# Patient Record
Sex: MALE | Race: WHITE | NOT HISPANIC OR LATINO
[De-identification: names, ages, dates, MRNs, and addresses within clinical notes are randomized per-mention and may not be internally consistent; named-entity substitution may affect disease eponyms.]

---

## 2023-06-29 ENCOUNTER — APPOINTMENT (OUTPATIENT)
Dept: ORTHOPEDIC SURGERY | Facility: CLINIC | Age: 38
End: 2023-06-29
Payer: OTHER MISCELLANEOUS

## 2023-06-29 VITALS — HEIGHT: 74 IN | WEIGHT: 200 LBS | BODY MASS INDEX: 25.67 KG/M2

## 2023-06-29 DIAGNOSIS — S16.1XXA STRAIN OF MUSCLE, FASCIA AND TENDON AT NECK LEVEL, INITIAL ENCOUNTER: ICD-10-CM

## 2023-06-29 PROBLEM — Z00.00 ENCOUNTER FOR PREVENTIVE HEALTH EXAMINATION: Status: ACTIVE | Noted: 2023-06-29

## 2023-06-29 PROCEDURE — 73030 X-RAY EXAM OF SHOULDER: CPT | Mod: RT

## 2023-06-29 PROCEDURE — 99203 OFFICE O/P NEW LOW 30 MIN: CPT | Mod: ACP

## 2023-06-29 RX ORDER — MELOXICAM 15 MG/1
15 TABLET ORAL
Qty: 30 | Refills: 0 | Status: ACTIVE | COMMUNITY
Start: 2023-06-29 | End: 1900-01-01

## 2023-06-29 RX ORDER — TIZANIDINE 4 MG/1
4 TABLET ORAL
Qty: 30 | Refills: 0 | Status: ACTIVE | COMMUNITY
Start: 2023-06-29 | End: 1900-01-01

## 2023-06-29 NOTE — DISCUSSION/SUMMARY
[de-identified] : Impression: Right shoulder injury possible rotator cuff injury and cervical strain.\par \par Plan: Patient was advised for physical therapy for his right shoulder and neck.\par Patient was advised for muscle relaxant and anti-inflammatories.\par Patient was advised for MRI of the right shoulder to evaluate any rotator cuff injury.\par At this time he is marked temporarily disabled unable to return to work until further notice.\par \par Follow-up: 4 weeks for repeat evaluation.\par \par

## 2023-06-29 NOTE — HISTORY OF PRESENT ILLNESS
[de-identified] : 37-year-old male here for an evaluation of using to the right shoulder, patient states that this injury happened on about June 22, 2023, patient states that he is a , he was at work when he was wrestling with a perpetrator and both of them fell on the floor, he landed on his right shoulder, patient states that also when he was applying the handcuffs the perpetrator was wrestling and aggravating his right shoulder.\par Patient states that since then he is having constant pain, the pain is 5/10 at rest and 8/10 with activities.\par Patient has significant pain with range of motion especially with overhead activities, his pain is also worse at nighttime.\par \par Patient also complains of numbness and tingling traveling to the right upper extremity.\par Patient denies any neck pain.\par \par \par Patient denies any allergy to medication.\par Patient denies any medical problems.\par Patient denies any current medication

## 2023-06-29 NOTE — IMAGING
[de-identified] : On examination of the right shoulder, patient has mild prominence over the AC joint, no ecchymosis or erythema noted.\par Patient has painful range of motion through the arc of motion of the shoulder, patient is able to forward flex to about 130 degrees with end of range pain, he is able to abduct to about 95 degrees.\par Patient is able to reach behind his neck and he is able to reach about L1.\par Patient has tenderness when palpating over the anterior and posterior aspect of the shoulder.\par Negative speeds.\par On discomfort over the AC joint.  Patient has minimal discomfort with cross abduction of the shoulder.\par Negative drop arm test.\par Positive resisted supraspinatus\par Positive apprehension.  Positive impingement.\par Pain with overhead activities.\par Motor strength is minus 5 out of 5 to internal and external rotation when compared to the left shoulder.\par Neurovascular intact.\par \par Patient has some discomfort when palpating over the right trapezius, no muscle spasm palpated.\par Patient has good range of motion to the cervical spine without significant end of range pain.\par Patient has positive Spurling's.\par \par X-ray of the right shoulder is negative for any acute fracture or dislocation, there is a questionable sprain over the AC joint.

## 2023-07-21 ENCOUNTER — APPOINTMENT (OUTPATIENT)
Dept: ORTHOPEDIC SURGERY | Facility: CLINIC | Age: 38
End: 2023-07-21
Payer: OTHER MISCELLANEOUS

## 2023-07-21 ENCOUNTER — NON-APPOINTMENT (OUTPATIENT)
Age: 38
End: 2023-07-21

## 2023-07-21 DIAGNOSIS — S43.431A SUPERIOR GLENOID LABRUM LESION OF RIGHT SHOULDER, INITIAL ENCOUNTER: ICD-10-CM

## 2023-07-21 PROCEDURE — 99213 OFFICE O/P EST LOW 20 MIN: CPT | Mod: ACP

## 2023-07-21 NOTE — HISTORY OF PRESENT ILLNESS
[de-identified] : 37-year-old male here for repeat evaluation of your sting to the right shoulder while at work, the senior happened on June 22, 2023.\par Patient works as a , he has not returned back to work yet.\par Patient states that he started physical therapy, does admits to some improvement of symptoms, but the pain is still present with activities.

## 2023-07-21 NOTE — IMAGING
[de-identified] : On examination of the right shoulder, patient has good range of motion over the right shoulder with end of range pain.\par Degrees prominence over the AC joint.\par Patient has positive apprehension.\par Negative drop arm test, mildly positive supraspinatus.\par Negative impingement.\par Good motion to internal and external rotation.\par Neurovascular intact.\par \par MRI of the right shoulder shows labral tears.

## 2023-07-21 NOTE — DISCUSSION/SUMMARY
[de-identified] : Impression: Right shoulder injury with a labral tear.\par \par Plan: Patient was advised to continue with physical therapy.\par Patient was advised for a consult with our shoulder specialist, appointment was given.\par Patient remains severe temporary disabled unable to return to work until further notice.\par \par Follow-up: 2 to 4 weeks.\par \par

## 2023-07-21 NOTE — WORK
[Torn Ligament/Tendon/Muscle] : torn ligament, tendon or muscle [Was the competent medical cause of the injury] : was the competent medical cause of the injury [Are consistent with the injury] : are consistent with the injury [Consistent with my objective findings] : consistent with my objective findings [Severe Partial] : severe partial [Cannot return to work because ________] : cannot return to work because [unfilled] [Lifting] : lifting [Pulling/Pushing] : pulling/pushing [Reaching overhead] : reaching overhead [I provided the services listed above] :  I provided the services listed above. [FreeTextEntry1] : Fair [FreeTextEntry3] : Ana Zafar PA-C

## 2023-08-07 ENCOUNTER — APPOINTMENT (OUTPATIENT)
Dept: ORTHOPEDIC SURGERY | Facility: CLINIC | Age: 38
End: 2023-08-07

## 2023-08-24 ENCOUNTER — APPOINTMENT (OUTPATIENT)
Dept: ORTHOPEDIC SURGERY | Facility: CLINIC | Age: 38
End: 2023-08-24
Payer: OTHER MISCELLANEOUS

## 2023-08-24 PROCEDURE — 20611 DRAIN/INJ JOINT/BURSA W/US: CPT | Mod: RT

## 2023-08-24 PROCEDURE — 99204 OFFICE O/P NEW MOD 45 MIN: CPT | Mod: 25

## 2023-08-24 NOTE — HISTORY OF PRESENT ILLNESS
[de-identified] : Patient is here for evaluation of right shoulder pain Affecting quality of life Wakes up at night due to pain  NAD Right shoulder: TTP ant GH joint, bicipital groove FF 0-175 ER 60 IR T12 4/5 strength scapular abduction, ER, IR Pos Impingement Pos [place] Pos Cross Arm Adduction Negative instability  XRay right shoulder negative for fracture, dislocation, arthritis  mri right shoulder: pRCT, labral tear  Plan went over findings explained the imaging and tear will cont cons tx for now op vs nonop explained right shoulder injection if no improvement will consdier surgery  Large Joint Injection was performed because of pain/rom. Anesthesia: ethyl chloride sprayed topically. Dexamethasone 2 cc of 4mg.   Lidocaine: 2 cc of 1%  Medication was injected in the right shoulder. Patient has tried OTC's including aspirin, Ibuprofen, Aleve etc or prescription NSAIDS, and/or exercises at home and/ or physical therapy without satisfactory response. After verbal consent using sterile preparation and technique. The risks, benefits, and alternatives to cortisone injection were explained in full to the patient. Risks outlined include but are not limited to infection, sepsis, bleeding, scarring, skin discoloration, temporary increase in pain, syncopal episode, failure to resolve symptoms, allergic reaction, symptom recurrence, and elevation of blood sugar in diabetics. Patient understood the risks. All questions were answered. After discussion of options, patient requested an injection. Oral informed consent was obtained and sterile prep was done of the injection site. Sterile technique was utilized for the procedure including the preparation of the solutions used for the injection. Patient tolerated the procedure well. Advised to ice the injection site this evening. Prep with alcohol locally to site. Sterile technique used. Diagnostic ultrasound was performed of the shoulder to confirm.

## 2023-08-24 NOTE — HISTORY OF PRESENT ILLNESS
[de-identified] : Patient is here for evaluation of right shoulder pain Affecting quality of life Wakes up at night due to pain  NAD Right shoulder: TTP ant GH joint, bicipital groove FF 0-175 ER 60 IR T12 4/5 strength scapular abduction, ER, IR Pos Impingement Pos [place] Pos Cross Arm Adduction Negative instability  XRay right shoulder negative for fracture, dislocation, arthritis  mri right shoulder: pRCT, labral tear  Plan went over findings explained the imaging and tear will cont cons tx for now op vs nonop explained right shoulder injection if no improvement will consdier surgery  Large Joint Injection was performed because of pain/rom. Anesthesia: ethyl chloride sprayed topically. Dexamethasone 2 cc of 4mg.   Lidocaine: 2 cc of 1%  Medication was injected in the right shoulder. Patient has tried OTC's including aspirin, Ibuprofen, Aleve etc or prescription NSAIDS, and/or exercises at home and/ or physical therapy without satisfactory response. After verbal consent using sterile preparation and technique. The risks, benefits, and alternatives to cortisone injection were explained in full to the patient. Risks outlined include but are not limited to infection, sepsis, bleeding, scarring, skin discoloration, temporary increase in pain, syncopal episode, failure to resolve symptoms, allergic reaction, symptom recurrence, and elevation of blood sugar in diabetics. Patient understood the risks. All questions were answered. After discussion of options, patient requested an injection. Oral informed consent was obtained and sterile prep was done of the injection site. Sterile technique was utilized for the procedure including the preparation of the solutions used for the injection. Patient tolerated the procedure well. Advised to ice the injection site this evening. Prep with alcohol locally to site. Sterile technique used. Diagnostic ultrasound was performed of the shoulder to confirm.

## 2023-08-25 ENCOUNTER — APPOINTMENT (OUTPATIENT)
Dept: ORTHOPEDIC SURGERY | Facility: CLINIC | Age: 38
End: 2023-08-25

## 2023-09-14 ENCOUNTER — APPOINTMENT (OUTPATIENT)
Dept: ORTHOPEDIC SURGERY | Facility: CLINIC | Age: 38
End: 2023-09-14
Payer: OTHER MISCELLANEOUS

## 2023-09-14 VITALS — HEIGHT: 74 IN | BODY MASS INDEX: 25.67 KG/M2 | WEIGHT: 200 LBS

## 2023-09-14 DIAGNOSIS — S49.91XA UNSPECIFIED INJURY OF RIGHT SHOULDER AND UPPER ARM, INITIAL ENCOUNTER: ICD-10-CM

## 2023-09-14 PROCEDURE — 99214 OFFICE O/P EST MOD 30 MIN: CPT
